# Patient Record
Sex: FEMALE | Race: WHITE | NOT HISPANIC OR LATINO | Employment: PART TIME | URBAN - METROPOLITAN AREA
[De-identification: names, ages, dates, MRNs, and addresses within clinical notes are randomized per-mention and may not be internally consistent; named-entity substitution may affect disease eponyms.]

---

## 2020-01-07 ENCOUNTER — CLINICAL SUPPORT (OUTPATIENT)
Dept: URGENT CARE | Facility: CLINIC | Age: 25
End: 2020-01-07
Payer: MEDICAID

## 2020-01-07 VITALS
SYSTOLIC BLOOD PRESSURE: 151 MMHG | DIASTOLIC BLOOD PRESSURE: 82 MMHG | HEIGHT: 66 IN | WEIGHT: 257.63 LBS | OXYGEN SATURATION: 99 % | TEMPERATURE: 97 F | HEART RATE: 90 BPM | BODY MASS INDEX: 41.4 KG/M2

## 2020-01-07 DIAGNOSIS — J02.9 PHARYNGITIS, UNSPECIFIED ETIOLOGY: Primary | ICD-10-CM

## 2020-01-07 DIAGNOSIS — R09.82 POST-NASAL DRIP: ICD-10-CM

## 2020-01-07 PROCEDURE — 99204 PR OFFICE/OUTPT VISIT, NEW, LEVL IV, 45-59 MIN: ICD-10-PCS | Mod: 25,S$GLB,, | Performed by: NURSE PRACTITIONER

## 2020-01-07 PROCEDURE — 99204 OFFICE O/P NEW MOD 45 MIN: CPT | Mod: 25,S$GLB,, | Performed by: NURSE PRACTITIONER

## 2020-01-07 RX ORDER — FLUTICASONE PROPIONATE 50 MCG
2 SPRAY, SUSPENSION (ML) NASAL 2 TIMES DAILY
Qty: 1 BOTTLE | Refills: 0 | Status: SHIPPED | OUTPATIENT
Start: 2020-01-07

## 2020-01-07 RX ORDER — CETIRIZINE HYDROCHLORIDE 10 MG/1
10 TABLET ORAL DAILY
Qty: 30 TABLET | Refills: 2 | Status: SHIPPED | OUTPATIENT
Start: 2020-01-07 | End: 2021-01-06

## 2020-01-07 RX ORDER — DEXAMETHASONE SODIUM PHOSPHATE 4 MG/ML
8 INJECTION, SOLUTION INTRA-ARTICULAR; INTRALESIONAL; INTRAMUSCULAR; INTRAVENOUS; SOFT TISSUE
Status: COMPLETED | OUTPATIENT
Start: 2020-01-07 | End: 2020-01-07

## 2020-01-07 RX ORDER — PREDNISONE 20 MG/1
20 TABLET ORAL 2 TIMES DAILY
Qty: 10 TABLET | Refills: 0 | Status: SHIPPED | OUTPATIENT
Start: 2020-01-07 | End: 2020-01-12

## 2020-01-07 RX ADMIN — DEXAMETHASONE SODIUM PHOSPHATE 8 MG: 4 INJECTION, SOLUTION INTRA-ARTICULAR; INTRALESIONAL; INTRAMUSCULAR; INTRAVENOUS; SOFT TISSUE at 04:01

## 2020-01-07 NOTE — PATIENT INSTRUCTIONS
Symptomatic treatment:    Alternate Tylenol and Ibuprofen every 3 hrs for fever, pain and inflammation. Avoid Nsaids if you are pregnant or have advanced kidney disease.     salt water gargles to soothe throat from post nasal drip  Honey/lemon water or warm tea to soothe throat from post nasal drip  Cepachol helps to soothe the discomfort in throat from post nasal drip    Cold-eeze helps to reduce the duration of URI symptoms if taken early  Elderberry to reduce duration of viral URI symptoms    Nasal saline spray reduces inflammation and dryness  Warm face compresses/hot showers as often as you can to open sinuses and allow to drain.   Flonase OTC or Nasacort OTC to help decrease inflammation in nasal turbinates and allow sinuses to drain    Vicks vapor rub at night  Simple foods like chicken noodle soup help provide hydration and nutrition    Delsym helps with coughing at night    Zantac will help if there is reflux from the post nasal drip and helpful to take at night    Zyrtec/Claritin during the day and Benadryl at night may help if allergy component concurrently with URI    Rest as much as you can    Your symptoms will likely last 5-7 days, maybe longer depending on how it affects your body.  You are contagious 5-7, so minimize contact with others to reduce the spread to others and stay home from work or school as we discussed. Dehydration is preventable but is one of the main reasons why you will feel so badly. Drink pedialyte, gatorade or propel. Stay hydrated.  Antibiotics are not needed unless a complication(such as Otitis Media, Bacterial sinus infection or pneumonia) develops. Taking antibiotics for Flu/Cold is not supported by evidence-based medicine and can expose you to unnecessary side effects of the medication, such as anaphylaxis, yeast infection and leads to antibiotic resistance.   If you experience any:  Chest pain, shortness of breath, wheezing or difficulty breathing,  Severe headache, face,  neck or ear pain,  New rash,  Fever over 101.5º F (38.6 C) for more than three days,  Confusion, behavior change or seizure,  Severe weakness or dizziness, please go to the ER immediately for further testing.             POST NASAL DRIP  Postnasal drip is a condition that causes a large amount of mucus to collect in your throat or nose. It may also be called upper airway cough syndrome because the mucus causes repeated coughing. You may have a sore throat, or throat tissues may swell. This may feel like a lump in your throat. You may also feel like you need to clear your throat often.    Manage postnasal drip:  Use a humidifier or vaporizer. Use a cool mist humidifier or a vaporizer to increase air moisture in your home. This may make it easier for you to breathe.  Drink more liquids as directed. Liquids help keep your air passages moist and help you cough up mucus. Ask how much liquid to drink each day and which liquids are best for you.  Sleep on propped up pillows, to keep the mucus from collecting at the back of your throat  Nasal irrigation (available over-the-counter)  Avoid cold air and dry, heated air. Cold or dry air can trigger postnasal drip. Try to stay inside on cold days, or keep your mouth covered. Do not stay long in areas that have dry, heated air.  Do not smoke, and avoid secondhand smoke. Nicotine and other chemicals in cigarettes and cigars can irritate your throat and make coughing worse. Ask your healthcare provider for information if you currently smoke and need help to quit. E-cigarettes or smokeless tobacco still contain nicotine. Talk to your healthcare provider before you use these products.    Medications  Medicines may be given to thin the mucus. You may need to swallow the medicine or use a device to flush your sinuses with liquid squirted into your nose. Nasal sprays may also be needed to keep the tissues in your nose moist. Medicines can also relieve congestion. Allergy medicine may  "help if your symptoms are caused by seasonal allergies, such as hay fever. You may need medicine to help control GERD.  Take your medicine as directed. Contact your healthcare provider if you think your medicine is not helping or if you have side effects. Tell him or her if you are allergic to any medicine. Keep a list of the medicines, vitamins, and herbs you take. Include the amounts, and when and why you take them. Bring the list or the pill bottles to follow-up visits. Carry your medicine list with you in case of an emergency.  A oral decongestant, such as pseudoephedrine (as in Sudafed) or phenylephrine (as in Sudafed PE or Tico-Synephrine)  Guaifenesin (as in Mucinex), a medication that can thin the mucus  An anti-histamine, such as  diphenhydramine, as in Benadryl, chlorpheniramine, as in Chlor-Trimeton, loratadine, as in Claritin or Alavert, fexofenadine (Allegra), cetirizine (Zyrtec), levocetirizine (Xyzal), desloratadine (Clarinex)  A nasal decongestant such as oxymetazoline (contained in Afrin) which constricts blood vessels in the nasal passages; this leads to less secretions. Such medications should only be taken for a day or two; longer-term use can cause more harm than good)  Keep in mind that many of these medications are combined in over-the-counter products. For example, there are several formulations of "Sudafed" containing pseudoephedrine or phenylephrine along with additional drugs including acetaminophen, dextromethorphan, and guaifenesin. While these combinations can be effective, it's important to read the label and avoid taking too much of any active ingredient.  What about prescription treatments?  If these approaches aren't effective, prescription treatments may be the next best steps, including:  A nasal steroid spray (such as beclomethasone/Beconase or triamcinolone/Nasacort)  Ipratropium (Atrovent) nasal spray which inhibits secretions (such as mucus)  Other treatments depend on the " cause of the post-nasal drip. Antibiotics are not usually helpful so they aren't usually prescribed for post-nasal drip (unless the symptoms are due to bacterial infection of the sinuses). For allergies, dusting and vacuuming often, covering your mattresses and pillowcases, and special air filter can help reduce exposure to allergy triggers.    What about chicken soup?  If you've been told that chicken soup helps with post-nasal drip (or other symptoms of a cold or flu), it's true! But it doesn't actually have to be chicken soup - any hot liquid can help thin the mucus and help you maintain hydration.    When should I call a doctor?  In most cases, post-nasal drip is annoying but not dangerous. However, you should contact your doctor if you have:  Unexplained fever  Bloody mucus  Wheezing or shortness of breath  Foul smelling drainage  Persistent symptoms despite treatment  The bad news/good news about post nasal drip  Post-nasal drip is among the most common causes of persistent cough, hoarseness, sore throat and other annoying symptoms. It can be caused by a number of conditions and may linger for weeks or months. That's the bad news. The good news is that most of the causes can be quickly identified and most will improve with treatment.    Self-Care for Sore Throats    Sore throats happen for many reasons, such as colds, allergies, and infections caused by viruses or bacteria. In any case, your throat becomes red and sore. Your goal for self-care is to reduce your discomfort while giving your throat a chance to heal.  Moisten and soothe your throat  Tips include the following:  · Try a sip of water first thing after waking up.  · Keep your throat moist by drinking 6 or more glasses of clear liquids every day.  · Run a cool-air humidifier in your room overnight.  · Avoid cigarette smoke.   · Suck on throat lozenges, cough drops, hard candy, ice chips, or frozen fruit-juice bars. Use the sugar-free versions if your  diet or medical condition requires them.  Gargle to ease irritation  Gargling every hour or 2 can ease irritation. Try gargling with 1 of these solutions:  · 1/4 teaspoon of salt in 1/2 cup of warm water  · An over-the-counter anesthetic gargle  Use medicine for more relief  Over-the-counter medicine can reduce sore throat symptoms. Ask your pharmacist if you have questions about which medicine to use:  · Ease pain with anesthetic sprays. Aspirin or an aspirin substitute also helps. Remember, never give aspirin to anyone 18 or younger, or if you are already taking blood thinners.   · For sore throats caused by allergies, try antihistamines to block the allergic reaction.  · Remember: unless a sore throat is caused by a bacterial infection, antibiotics wont help you.  Prevent future sore throats  Prevention tips include the following:  · Stop smoking or reduce contact with secondhand smoke. Smoke irritates the tender throat lining.  · Limit contact with pets and with allergy-causing substances, such as pollen and mold.  · When youre around someone with a sore throat or cold, wash your hands often to keep viruses or bacteria from spreading.  · Dont strain your vocal cords.  Call your healthcare provider  Contact your healthcare provider if you have:  · A temperature over 101°F (38.3°C)  · White spots on the throat  · Great difficulty swallowing  · Trouble breathing  · A skin rash  · Recent exposure to someone else with strep bacteria  · Severe hoarseness and swollen glands in the neck or jaw   Date Last Reviewed: 8/1/2016  © 3904-6135 [employer]. [address] [place] [address], [place] [address]. All rights reserved. This information is not intended as a substitute for professional medical care. Always follow your healthcare professional's instructions.

## 2020-01-07 NOTE — PROGRESS NOTES
"Subjective:       Patient ID: JOHNATHON Schulz is a 24 y.o. female.    Vitals:  height is 5' 6" (1.676 m) and weight is 116.8 kg (257 lb 9.6 oz). Her oral temperature is 97.1 °F (36.2 °C). Her blood pressure is 151/82 (abnormal) and her pulse is 90. Her oxygen saturation is 99%.     Chief Complaint: Sore Throat    Sore Throat    This is a new problem. The current episode started in the past 7 days. The problem has been gradually worsening. Neither side of throat is experiencing more pain than the other. There has been no fever. The pain is moderate. Associated symptoms include congestion and trouble swallowing. Pertinent negatives include no coughing, diarrhea, headaches, shortness of breath or vomiting. Associated symptoms comments: Flank pain  . She has tried NSAIDs for the symptoms. The treatment provided no relief.       Constitution: Negative for chills, fatigue and fever.   HENT: Positive for congestion, postnasal drip, sinus pain, sinus pressure, sore throat and trouble swallowing.    Neck: Negative for painful lymph nodes.   Cardiovascular: Negative for chest pain and leg swelling.   Eyes: Negative for double vision and blurred vision.   Respiratory: Negative for cough and shortness of breath.    Gastrointestinal: Negative for nausea, vomiting and diarrhea.   Genitourinary: Negative for dysuria, frequency, urgency and history of kidney stones.   Musculoskeletal: Negative for joint pain, joint swelling, muscle cramps and muscle ache.   Skin: Negative for color change, pale, rash and bruising.   Allergic/Immunologic: Negative for seasonal allergies.   Neurological: Negative for dizziness, history of vertigo, light-headedness, passing out and headaches.   Hematologic/Lymphatic: Negative for swollen lymph nodes.   Psychiatric/Behavioral: Negative for nervous/anxious, sleep disturbance and depression. The patient is not nervous/anxious.        Objective:      Physical Exam   Constitutional: She is oriented to " person, place, and time. She appears well-developed and well-nourished. She is cooperative.  Non-toxic appearance. She does not appear ill. No distress.   HENT:   Head: Normocephalic and atraumatic.   Right Ear: Hearing, external ear and ear canal normal. A middle ear effusion is present.   Left Ear: Hearing, external ear and ear canal normal. A middle ear effusion is present.   Nose: Rhinorrhea present. No mucosal edema or nasal deformity. No epistaxis. Right sinus exhibits maxillary sinus tenderness. Right sinus exhibits no frontal sinus tenderness. Left sinus exhibits maxillary sinus tenderness. Left sinus exhibits no frontal sinus tenderness.   Mouth/Throat: Uvula is midline and mucous membranes are normal. No trismus in the jaw. Normal dentition. No uvula swelling. Posterior oropharyngeal erythema and cobblestoning present.   Eyes: Conjunctivae and lids are normal. Right eye exhibits no discharge. Left eye exhibits no discharge. No scleral icterus.   Neck: Trachea normal, normal range of motion, full passive range of motion without pain and phonation normal. Neck supple.   Cardiovascular: Normal rate, regular rhythm, normal heart sounds, intact distal pulses and normal pulses.   Pulmonary/Chest: Effort normal and breath sounds normal. No respiratory distress.   Abdominal: Soft. Normal appearance and bowel sounds are normal. She exhibits no distension, no pulsatile midline mass and no mass. There is no tenderness.   Musculoskeletal: Normal range of motion. She exhibits no edema or deformity.   Neurological: She is alert and oriented to person, place, and time. She exhibits normal muscle tone. Coordination normal.   Skin: Skin is warm, dry, intact, not diaphoretic and not pale.   Psychiatric: She has a normal mood and affect. Her speech is normal and behavior is normal. Judgment and thought content normal. Cognition and memory are normal.   Nursing note and vitals reviewed.        Assessment:       1.  Pharyngitis, unspecified etiology    2. Post-nasal drip        Plan:         Pharyngitis, unspecified etiology    Post-nasal drip    Other orders  -     dexamethasone injection 8 mg  -     predniSONE (DELTASONE) 20 MG tablet; Take 1 tablet (20 mg total) by mouth 2 (two) times daily. for 5 days  Dispense: 10 tablet; Refill: 0  -     fluticasone propionate (FLONASE) 50 mcg/actuation nasal spray; 2 sprays (100 mcg total) by Each Nostril route 2 (two) times daily.  Dispense: 1 Bottle; Refill: 0  -     cetirizine (ZYRTEC) 10 MG tablet; Take 1 tablet (10 mg total) by mouth once daily.  Dispense: 30 tablet; Refill: 2

## 2025-01-30 ENCOUNTER — PATIENT MESSAGE (OUTPATIENT)
Dept: FAMILY MEDICINE | Facility: CLINIC | Age: 30
End: 2025-01-30
Payer: MEDICAID